# Patient Record
Sex: FEMALE | Race: WHITE | Employment: FULL TIME | URBAN - METROPOLITAN AREA
[De-identification: names, ages, dates, MRNs, and addresses within clinical notes are randomized per-mention and may not be internally consistent; named-entity substitution may affect disease eponyms.]

---

## 2017-04-24 ENCOUNTER — TELEPHONE (OUTPATIENT)
Dept: FAMILY MEDICINE | Facility: OTHER | Age: 37
End: 2017-04-24

## 2017-04-24 ENCOUNTER — HOSPITAL ENCOUNTER (OUTPATIENT)
Dept: CT IMAGING | Facility: CLINIC | Age: 37
Discharge: HOME | End: 2017-04-24
Attending: FAMILY MEDICINE | Admitting: FAMILY MEDICINE
Payer: COMMERCIAL

## 2017-04-24 ENCOUNTER — OFFICE VISIT (OUTPATIENT)
Dept: FAMILY MEDICINE | Facility: OTHER | Age: 37
End: 2017-04-24
Payer: COMMERCIAL

## 2017-04-24 VITALS
DIASTOLIC BLOOD PRESSURE: 80 MMHG | BODY MASS INDEX: 34.66 KG/M2 | TEMPERATURE: 98.4 F | SYSTOLIC BLOOD PRESSURE: 122 MMHG | RESPIRATION RATE: 18 BRPM | HEART RATE: 80 BPM | WEIGHT: 203 LBS | HEIGHT: 64 IN

## 2017-04-24 DIAGNOSIS — R10.84 ABDOMINAL PAIN, GENERALIZED: Primary | ICD-10-CM

## 2017-04-24 DIAGNOSIS — R23.2 FLUSHING: ICD-10-CM

## 2017-04-24 DIAGNOSIS — R10.84 ABDOMINAL PAIN, GENERALIZED: ICD-10-CM

## 2017-04-24 LAB
ALBUMIN SERPL-MCNC: 3.9 G/DL (ref 3.4–5)
ALP SERPL-CCNC: 65 U/L (ref 40–150)
ALT SERPL W P-5'-P-CCNC: 36 U/L (ref 0–50)
AMYLASE SERPL-CCNC: 40 U/L (ref 30–110)
ANION GAP SERPL CALCULATED.3IONS-SCNC: 8 MMOL/L (ref 3–14)
AST SERPL W P-5'-P-CCNC: 20 U/L (ref 0–45)
BASOPHILS # BLD AUTO: 0 10E9/L (ref 0–0.2)
BASOPHILS NFR BLD AUTO: 0.5 %
BILIRUB SERPL-MCNC: 0.5 MG/DL (ref 0.2–1.3)
BUN SERPL-MCNC: 10 MG/DL (ref 7–30)
CALCIUM SERPL-MCNC: 8.4 MG/DL (ref 8.5–10.1)
CHLORIDE SERPL-SCNC: 105 MMOL/L (ref 94–109)
CO2 SERPL-SCNC: 28 MMOL/L (ref 20–32)
CREAT SERPL-MCNC: 0.8 MG/DL (ref 0.52–1.04)
DIFFERENTIAL METHOD BLD: NORMAL
EOSINOPHIL # BLD AUTO: 0 10E9/L (ref 0–0.7)
EOSINOPHIL NFR BLD AUTO: 0 %
ERYTHROCYTE [DISTWIDTH] IN BLOOD BY AUTOMATED COUNT: 13.3 % (ref 10–15)
GFR SERPL CREATININE-BSD FRML MDRD: 80 ML/MIN/1.7M2
GLUCOSE SERPL-MCNC: 73 MG/DL (ref 70–99)
HCT VFR BLD AUTO: 37.8 % (ref 35–47)
HGB BLD-MCNC: 13.1 G/DL (ref 11.7–15.7)
LIPASE SERPL-CCNC: 98 U/L (ref 73–393)
LYMPHOCYTES # BLD AUTO: 1.6 10E9/L (ref 0.8–5.3)
LYMPHOCYTES NFR BLD AUTO: 36.7 %
MCH RBC QN AUTO: 31.3 PG (ref 26.5–33)
MCHC RBC AUTO-ENTMCNC: 34.7 G/DL (ref 31.5–36.5)
MCV RBC AUTO: 90 FL (ref 78–100)
MONOCYTES # BLD AUTO: 0.3 10E9/L (ref 0–1.3)
MONOCYTES NFR BLD AUTO: 7.1 %
NEUTROPHILS # BLD AUTO: 2.4 10E9/L (ref 1.6–8.3)
NEUTROPHILS NFR BLD AUTO: 55.7 %
PLATELET # BLD AUTO: 241 10E9/L (ref 150–450)
POTASSIUM SERPL-SCNC: 3.4 MMOL/L (ref 3.4–5.3)
PROT SERPL-MCNC: 6.9 G/DL (ref 6.8–8.8)
RBC # BLD AUTO: 4.18 10E12/L (ref 3.8–5.2)
SODIUM SERPL-SCNC: 141 MMOL/L (ref 133–144)
WBC # BLD AUTO: 4.2 10E9/L (ref 4–11)

## 2017-04-24 PROCEDURE — 80053 COMPREHEN METABOLIC PANEL: CPT | Performed by: FAMILY MEDICINE

## 2017-04-24 PROCEDURE — 99214 OFFICE O/P EST MOD 30 MIN: CPT | Performed by: FAMILY MEDICINE

## 2017-04-24 PROCEDURE — 74177 CT ABD & PELVIS W/CONTRAST: CPT

## 2017-04-24 PROCEDURE — 36415 COLL VENOUS BLD VENIPUNCTURE: CPT | Performed by: FAMILY MEDICINE

## 2017-04-24 PROCEDURE — 25500064 ZZH RX 255 OP 636: Performed by: RADIOLOGY

## 2017-04-24 PROCEDURE — 83690 ASSAY OF LIPASE: CPT | Performed by: FAMILY MEDICINE

## 2017-04-24 PROCEDURE — 85025 COMPLETE CBC W/AUTO DIFF WBC: CPT | Performed by: FAMILY MEDICINE

## 2017-04-24 PROCEDURE — 82150 ASSAY OF AMYLASE: CPT | Performed by: FAMILY MEDICINE

## 2017-04-24 PROCEDURE — 25000125 ZZHC RX 250: Performed by: RADIOLOGY

## 2017-04-24 RX ORDER — IOPAMIDOL 755 MG/ML
500 INJECTION, SOLUTION INTRAVASCULAR ONCE
Status: COMPLETED | OUTPATIENT
Start: 2017-04-24 | End: 2017-04-24

## 2017-04-24 RX ADMIN — SODIUM CHLORIDE 60 ML: 9 INJECTION, SOLUTION INTRAVENOUS at 13:42

## 2017-04-24 RX ADMIN — IOPAMIDOL 100 ML: 755 INJECTION, SOLUTION INTRAVENOUS at 13:42

## 2017-04-24 ASSESSMENT — PAIN SCALES - GENERAL: PAINLEVEL: MODERATE PAIN (4)

## 2017-04-24 NOTE — TELEPHONE ENCOUNTER
DID discussed EGD as next step, but informed her I would like urine back first to see if we need colonoscopy evaluation as well.  Lynn Lynn MD

## 2017-04-24 NOTE — PROGRESS NOTES
SUBJECTIVE:                                                    Janeth Suárez is a 37 year old female who presents to clinic today for the following health issues:      HPI    ED/UC Followup:    Facility:  [facility]  Date of visit: 4/23/17  Reason for visit: abominal pain  Current Status: same     GI/MS: The patient reports that she has been experiencing abdominal and back pain recently. She took pain medication last night and reports that they have helped greatly with the pain. Pain starts in upper abdomen and radiates into mid back. Episodes will start gradually and worsen. She reports that the back pain is more tolerable compared to the abdominal pain. She reports that eating can make her abdominal pain worse. Pain does not improve after bowel movements.    Problem list and histories reviewed & adjusted, as indicated.  Additional history: as documented    Patient Active Problem List   Diagnosis     CARDIOVASCULAR SCREENING; LDL GOAL LESS THAN 160     Tobacco use disorder     Past Surgical History:   Procedure Laterality Date     C/SECTION, CLASSICAL      X 2       Social History   Substance Use Topics     Smoking status: Current Every Day Smoker     Packs/day: 0.50     Types: Cigarettes     Smokeless tobacco: Never Used      Comment: E-Cig     Alcohol use Yes      Comment: sparingly     Family History   Problem Relation Age of Onset     Mental Illness Mother      DIABETES No family hx of      Coronary Artery Disease No family hx of      Hypertension No family hx of      Hyperlipidemia No family hx of      Breast Cancer No family hx of      Cancer - colorectal No family hx of      Ovarian Cancer No family hx of      Prostate Cancer No family hx of      Other Cancer No family hx of      Depression/Anxiety No family hx of      CEREBROVASCULAR DISEASE No family hx of      Anesthesia Reaction No family hx of      Thyroid Disease No family hx of      Asthma No family hx of      OSTEOPOROSIS No family hx of       "Chemical Addiction No family hx of      Known Genetic Syndrome No family hx of      Obesity No family hx of            ROS:  Constitutional, HEENT, cardiovascular, pulmonary, GI, , musculoskeletal, neuro, skin, endocrine and psych systems are negative, except as in HPI or otherwise noted     This document serves as a record of the services and decisions personally performed and made by Lynn Lynn MD. It was created on her behalf by Celso Sanchez , a trained medical scribe. The creation of this document is based the provider's statements to the medical scribe.  Celso Sanchez, April 24, 2017 10:44 AM     OBJECTIVE:                                                    /80 (BP Location: Left arm, Patient Position: Chair, Cuff Size: Adult Large)  Pulse 80  Temp 98.4  F (36.9  C) (Temporal)  Resp 18  Ht 1.613 m (5' 3.5\")  Wt 92.1 kg (203 lb)  LMP 04/21/2017 (Exact Date)  Breastfeeding? No  BMI 35.4 kg/m2  Body mass index is 35.4 kg/(m^2).   GENERAL: healthy, alert, well nourished, well hydrated, no distress  ABDOMEN: RLQ tender to palpation.  MS: extremities- no gross deformities noted, no edema  SKIN: no suspicious lesions, no rashes  PSYCH: Alert and oriented times 3; speech- coherent , normal rate and volume; able to articulate logical thoughts, able to abstract reason, no tangential thoughts, no hallucinations or delusions, affect- normal    No results found for this or any previous visit (from the past 24 hour(s)).     ASSESSMENT/PLAN:                                                        ICD-10-CM    1. Abdominal pain, generalized R10.84 Lipase     Amylase     **Comprehensive metabolic panel FUTURE anytime     CBC with platelets and differential     CT Abdomen Pelvis w Contrast     Patient is having RLQ ab pain today, though a little into the epigastric as well.  Started several months ago while in [place].  Worsening now, so not sure if 2 different processes.  Will get labs for common epigastric " from 2 months ago issues and CT scan for the appendix which is the more immediate concern.  Also has back pain, but has some of this chronic and is suspicious it may be worse due to sitting funny to try to get comfortable.  May consider stool sample o and p if eos are high.  Otherwise endoscopy if all else is normal.      There are no Patient Instructions on file for this visit.    The information in this document, created by the medical scribe for me, accurately reflects the services I personally performed and the decisions made by me. I have reviewed and approved this document for accuracy.   MD Lynn Song MD, MD  [facility]

## 2017-04-24 NOTE — MR AVS SNAPSHOT
After Visit Summary   4/24/2017    Janeth Suárez    MRN: [id number]           Patient Information     Date Of Birth          1980        Visit Information        Provider Department      4/24/2017 10:30 AM Lynn Lynn MD [facility]        Today's Diagnoses     Abdominal pain, generalized    -  1       Follow-ups after your visit        Your next 10 appointments already scheduled     Apr 24, 2017  1:30 PM CDT   CT ABDOMEN PELVIS W CONTRAST with PHCT1   [facility] CT Scan ([facility])    [address] [place] [id number] [phone]           Please bring any scans or X-rays taken at other hospitals, if similar tests were done. Also bring a list of your medicines, including vitamins, minerals and over-the-counter drugs. It is safest to leave personal items at home.  Be sure to tell your doctor:   If you have any allergies.   If there s any chance you are pregnant.   If you are breastfeeding.   If you have any special needs.  You may have contrast for this exam. To prepare:   Do not eat or drink for 2 hours before your exam. If you need to take medicine, you may take it with small sips of water. (We may ask you to take liquid medicine as well.)   The day before your exam, drink extra fluids at least six 8-ounce glasses (unless your doctor tells you to restrict your fluids).  Patients over 70 or patients with diabetes or kidney problems:   If you haven t had a blood test (creatinine test) within the last 30 days, go to your clinic or Diagnostic Imaging Department for this test.  If you have diabetes:   If your kidney function is normal, continue taking your metformin (Avandamet, Glucophage, Glucovance, Metaglip) on the day of your exam.   If your kidney function is abnormal, wait 48 hours before restarting this medicine.  You will have oral contrast for this exam:   You will drink the contrast at home. Get this from your clinic or  "Diagnostic Imaging Department. Please follow the directions given.  Please wear loose clothing, such as a sweat suit or jogging clothes. Avoid snaps, zippers and other metal. We may ask you to undress and put on a hospital gown.  If you have any questions, please call the Imaging Department where you will have your exam.              Future tests that were ordered for you today     Open Future Orders        Priority Expected Expires Ordered    CT Abdomen Pelvis w Contrast Routine  2018            Who to contact     If you have questions or need follow up information about today's clinic visit or your schedule please contact [facility] directly at [phone].  Normal or non-critical lab and imaging results will be communicated to you by MyChart, letter or phone within 4 business days after the clinic has received the results. If you do not hear from us within 7 days, please contact the clinic through [employer] or phone. If you have a critical or abnormal lab result, we will notify you by phone as soon as possible.  Submit refill requests through [employer] or call your pharmacy and they will forward the refill request to us. Please allow 3 business days for your refill to be completed.          Additional Information About Your Visit        [employer] Information     [employer] lets you send messages to your doctor, view your test results, renew your prescriptions, schedule appointments and more. To sign up, go to [email or web address] . Click on \"Log in\" on the left side of the screen, which will take you to the Welcome page. Then click on \"Sign up Now\" on the right side of the page.     You will be asked to enter the access code listed below, as well as some personal information. Please follow the directions to create your username and password.     Your access code is: [email or web address]  Expires: 2017 11:07 AM     Your access code will  in 90 days. If you need help or a new code, " "please call your [facility] clinic or [phone].        Care EveryWhere ID     This is your Care EveryWhere ID. This could be used by other organizations to access your [facility] medical records  [id number]        Your Vitals Were     Pulse Temperature Respirations Height Last Period Breastfeeding?    80 98.4  F (36.9  C) (Temporal) 18 5' 3.5\" (1.613 m) 04/21/2017 (Exact Date) No    BMI (Body Mass Index)                   35.4 kg/m2            Blood Pressure from Last 3 Encounters:   04/24/17 122/80   02/23/15 128/74   12/18/14 120/82    Weight from Last 3 Encounters:   04/24/17 203 lb (92.1 kg)   02/23/15 195 lb 1.6 oz (88.5 kg)   12/18/14 195 lb 14.4 oz (88.9 kg)              We Performed the Following     **Comprehensive metabolic panel FUTURE anytime     Amylase     CBC with platelets and differential     Lipase        Primary Care Provider Office Phone # Fax #    JULIO CÉSAR [facility] [phone]       [facility] [address] [facility] [address]        Thank you!     Thank you for choosing [facility]  for your care. Our goal is always to provide you with excellent care. Hearing back from our patients is one way we can continue to improve our services. Please take a few minutes to complete the written survey that you may receive in the mail after your visit with us. Thank you!             Your Updated Medication List - Protect others around you: Learn how to safely use, store and throw away your medicines at www.disposemymeds.org.          This list is accurate as of: 4/24/17 11:07 AM.  Always use your most recent med list.                   Brand Name Dispense Instructions for use    biotin 2.5 mg/mL Susp      Take by mouth daily       fish oil-omega-3 fatty acids 1000 MG capsule      Take 2 g by mouth daily       MULTIVITAMIN PO          VITAMIN D (CHOLECALCIFEROL) PO      Take by mouth daily       zolpidem 5 MG tablet    AMBIEN    30 tablet    Take 0.5 tablets " (2.5 mg) by mouth nightly as needed for sleep

## 2017-04-24 NOTE — TELEPHONE ENCOUNTER
Call from [facility] -- adrenal adenoma not well seen as no non-contrast CT done.  But was noted and her flushing is concerning for possible pheo.  So far other labs are normal, so attempted to get her to  urine container for 24 hour urine.  Order in and missed her as she was not answering her cell phone and radiology let her go before I spoke with her.  LM on her cell to call back and can instruct labs so far normal.  Need a 24 hour urine sample.  Lynn Lynn MD

## 2017-04-24 NOTE — NURSING NOTE
"No chief complaint on file.      Initial /80 (BP Location: Left arm, Patient Position: Chair, Cuff Size: Adult Large)  Pulse 80  Temp 98.4  F (36.9  C) (Temporal)  Resp 18  Ht 5' 3.5\" (1.613 m)  Wt 203 lb (92.1 kg)  LMP 04/21/2017 (Exact Date)  Breastfeeding? No  BMI 35.4 kg/m2 Estimated body mass index is 35.4 kg/(m^2) as calculated from the following:    Height as of this encounter: 5' 3.5\" (1.613 m).    Weight as of this encounter: 203 lb (92.1 kg).  Medication Reconciliation: complete  "

## 2017-04-24 NOTE — TELEPHONE ENCOUNTER
Patient is wondering if she can do the 24 hour urine on one of her days off as she can not do this at work.  Also was unclear on CT result and wondering if there is anything concerning she needs to know about.  She can be reached at [phone].

## 2017-05-09 ENCOUNTER — TRANSFERRED RECORDS (OUTPATIENT)
Dept: HEALTH INFORMATION MANAGEMENT | Facility: CLINIC | Age: 37
End: 2017-05-09

## 2017-05-10 ENCOUNTER — TRANSFERRED RECORDS (OUTPATIENT)
Dept: HEALTH INFORMATION MANAGEMENT | Facility: CLINIC | Age: 37
End: 2017-05-10

## 2017-05-11 ENCOUNTER — TRANSFERRED RECORDS (OUTPATIENT)
Dept: HEALTH INFORMATION MANAGEMENT | Facility: CLINIC | Age: 37
End: 2017-05-11

## 2017-06-01 ENCOUNTER — TRANSFERRED RECORDS (OUTPATIENT)
Dept: HEALTH INFORMATION MANAGEMENT | Facility: CLINIC | Age: 37
End: 2017-06-01

## 2017-06-19 DIAGNOSIS — R23.2 FLUSHING: ICD-10-CM

## 2017-06-19 DIAGNOSIS — R10.84 ABDOMINAL PAIN, GENERALIZED: ICD-10-CM

## 2017-06-19 PROCEDURE — 99000 SPECIMEN HANDLING OFFICE-LAB: CPT | Performed by: FAMILY MEDICINE

## 2017-06-19 PROCEDURE — 82384 ASSAY THREE CATECHOLAMINES: CPT | Mod: 90 | Performed by: FAMILY MEDICINE

## 2017-06-19 NOTE — PROGRESS NOTES
SUBJECTIVE:                                                    Janeth Suárez is a 37 year old female who presents to clinic today for the following health issues:    Pt is here to follow up on labs.     Results for orders placed or performed in visit on 06/19/17   Catecholamines fractioned free urine   Result Value Ref Range    Catecholamines Duration Urine 24 h    Catecholamines Volume Urine 2000 mL    Urine Dopamine/Creatinine 235     Urine Dopamine 362 (H)     Dopamine per Volume Urine 1     Urine Norepineph/Creatinine 18     Urine Norepinephrine 28     Norepinephrine per Volume Urine 14     Urine Epinephrine/Creatinine 1     Urine Epinephrine 2     Epinephrine per Volume Urine 181     Catecholamines Fract Urine Free Interp       SEE NOTE  (Note)  TEST INFORMATION: Catecholamines Fractionated, Urine Free  The optimal specimen for this testing is a 24-hour urine  collection. Mass per day calculations are not reported for  patients younger than 4 years of age and for the following  specimen types: a random collection, a collection with  duration of less than 20 hours, a collection with duration  of greater than 28 hours, or a collection with total volume  less than 400 mL (if 18 years of age or older) or greater  than 5000 mL (all ages). Ratios to creatinine may be useful  for these evaluations.  Smaller increases in catecholamine concentrations (less  than two times the upper limit) usually are the result of  physiological stimuli, drugs, or improper specimen  collection. Significant elevation of one or more  catecholamines (three or more times the upper reference  limit) is associated with an increased probability of a  neuroendocrine tumor.  Access complete set of age- and/or gender-specific  reference interva ls for this test in the [facility]  Test Directory (TutorialTab).  Test developed and characteristics determined by [facility] [facility]. See Compliance Statement B: [employer]/CS       Catecholamines Fract Creat Random Urine Free 77     Catecholamines Fract Creat Timed Urine Free 1540      HPI    Concern - Stomach aches/ kidney pain      Onset: 3-22-17    Description:   Food is causing stomach ache, upper stomach area.   Kidney pain dull and ache constant     Intensity: moderate, severe    Progression of Symptoms:  constant    Accompanying Signs & Symptoms:  Pt states that she feels off, and a burning feeling in the stomach        Previous history of similar problem:   None     Precipitating factors:   Worsened by: food     Alleviating factors:  Improved by: none        Therapies Tried and outcome: acid reducer, pepto bismol, Gas-x pills, Anti acids. Tried tramadol, needs Ambien refill.    MS/: The patient reports kidney pain.since 3/22. She notes that pain episodes often pulsate with pain. Her last episode was last Wednesday. Her GI doctor told her that there was no reason for her pain. She notes that her abdominal pain is dull and consistent and lasts for 8 hours. She also notes associated symptoms of anxiety during these episodes.    CONST: The patient reports that she is having difficulty falling asleep. She is unsure if it is due to anxiety.    Problem list and histories reviewed & adjusted, as indicated.  Additional history: as documented    Patient Active Problem List   Diagnosis     CARDIOVASCULAR SCREENING; LDL GOAL LESS THAN 160     Tobacco use disorder     Past Surgical History:   Procedure Laterality Date     C/SECTION, CLASSICAL      X 2       Social History   Substance Use Topics     Smoking status: Current Every Day Smoker     Packs/day: 0.50     Types: Cigarettes     Smokeless tobacco: Never Used      Comment: E-Cig     Alcohol use Yes      Comment: sparingly     Family History   Problem Relation Age of Onset     Mental Illness Mother      DIABETES No family hx of      Coronary Artery Disease No family hx of      Hypertension No family hx of      Hyperlipidemia No family hx of       Breast Cancer No family hx of      Cancer - colorectal No family hx of      Ovarian Cancer No family hx of      Prostate Cancer No family hx of      Other Cancer No family hx of      Depression/Anxiety No family hx of      CEREBROVASCULAR DISEASE No family hx of      Anesthesia Reaction No family hx of      Thyroid Disease No family hx of      Asthma No family hx of      OSTEOPOROSIS No family hx of      Chemical Addiction No family hx of      Known Genetic Syndrome No family hx of      Obesity No family hx of            ROS:  Constitutional, HEENT, cardiovascular, pulmonary, GI, , musculoskeletal, neuro, skin, endocrine and psych systems are negative, except as in HPI or otherwise noted     This document serves as a record of the services and decisions personally performed and made by Lynn Lynn MD. It was created on her behalf by Celso Sanchez , a trained medical scribe. The creation of this document is based the provider's statements to the medical scribe.  Celso Sanchez, June 22, 2017 4:38 PM     OBJECTIVE:                                                    /82  Pulse 81  Temp 97.6  F (36.4  C) (Oral)  Resp 16  Wt 199 lb (90.3 kg)  SpO2 99%  BMI 34.7 kg/m2  Body mass index is 34.7 kg/(m^2).   GENERAL: healthy, alert, well nourished, well hydrated, no distress, obese.  MS: Point tenderness slightly above SI joint bilaterally.  SKIN: no suspicious lesions, no rashes  PSYCH: Alert and oriented times 3; speech- coherent , normal rate and volume; able to articulate logical thoughts, able to abstract reason, no tangential thoughts, no hallucinations or delusions, affect- normal    No results found for this or any previous visit (from the past 24 hour(s)).     ASSESSMENT/PLAN:                                                        ICD-10-CM    1. Insomnia, unspecified type G47.00 SLEEP EVALUATION & MANAGEMENT REFERRAL - ADULT   2. Tobacco use disorder F17.200    3. Anxiety F41.9      -Discussed  previous lab results, told patient that her Catecholamine levels were normal. -hida scan normal.  GI completed normal evaluation.  Diagnosis is IBS.  -Discussed functional bowel disease and IBS in some detail; FODMAP diet info given to pt. (see pt instructions), pt notes that her abdominal pain causes her to have bowel movements during episodic events.  -Discussed her previous CT scan, discussed her left adrenal adenoma, answered questions/concerns.  -Discussed her sleep issues, I recommended anxiety and stress reduction + exercise. Sleep specialist referral if no improvement.  Patient was in denial that her symptoms may be anxiety as she feels her life does not have much stress at this time.  But with her current symptoms, the most likely reason is anxiety and there is nothing else that the work up indicates.  As her GI symptoms are so frustrating for her and the med GI gave her to try did not help.  I did discuss that anxiety is a trigger and that treatment of her anxiety is likely to give her the greatest result.  Willing to try meds.  Will start zoloft and can f/u virtually in 1 month.    Patient Instructions   FODMAP diet, reduce stress, note IBS triggers.  -avoid NSAID's, tylenol is ok to use. Biofreeze, other OTC ointments.  -Cardio exercise 30 mins daily + yoga/meditation daily. Write down stuff that are on your head, clear your head before you meditate. Sleep specialist referral if no improvement.    -Email/phone or office visit in a few weeks regarding your anxiety.    Length of visit was 55 minutes with more than 50 percent of that time used for discussing medical concerns and education    The information in this document, created by the medical scribe for me, accurately reflects the services I personally performed and the decisions made by me. I have reviewed and approved this document for accuracy.   MD Lynn Song MD, MD  [facility]

## 2017-06-22 ENCOUNTER — OFFICE VISIT (OUTPATIENT)
Dept: FAMILY MEDICINE | Facility: OTHER | Age: 37
End: 2017-06-22
Payer: COMMERCIAL

## 2017-06-22 VITALS
RESPIRATION RATE: 16 BRPM | OXYGEN SATURATION: 99 % | SYSTOLIC BLOOD PRESSURE: 124 MMHG | WEIGHT: 199 LBS | TEMPERATURE: 97.6 F | HEART RATE: 81 BPM | BODY MASS INDEX: 34.7 KG/M2 | DIASTOLIC BLOOD PRESSURE: 82 MMHG

## 2017-06-22 DIAGNOSIS — G47.00 INSOMNIA, UNSPECIFIED TYPE: Primary | ICD-10-CM

## 2017-06-22 DIAGNOSIS — F17.200 TOBACCO USE DISORDER: ICD-10-CM

## 2017-06-22 DIAGNOSIS — F41.9 ANXIETY: ICD-10-CM

## 2017-06-22 PROCEDURE — 99215 OFFICE O/P EST HI 40 MIN: CPT | Performed by: FAMILY MEDICINE

## 2017-06-22 ASSESSMENT — PAIN SCALES - GENERAL: PAINLEVEL: NO PAIN (0)

## 2017-06-22 NOTE — PATIENT INSTRUCTIONS
FODMAP diet, reduce stress, note IBS triggers.  -avoid NSAID's, tylenol is ok to use. Biofreeze, other OTC ointments.  -Cardio exercise 30 mins daily + yoga/meditation daily. Write down stuff that are on your head, clear your head before you meditate. Sleep specialist referral if no improvement.    -Email/phone or office visit in a few weeks regarding your anxiety.

## 2017-06-22 NOTE — NURSING NOTE
"Chief Complaint   Patient presents with     Lab Result Notice     Health Maintenance     justice, mychart, tobacco use       Initial /82  Pulse 81  Temp 97.6  F (36.4  C) (Oral)  Resp 16  Wt 199 lb (90.3 kg)  SpO2 99%  BMI 34.7 kg/m2 Estimated body mass index is 34.7 kg/(m^2) as calculated from the following:    Height as of 4/24/17: 5' 3.5\" (1.613 m).    Weight as of this encounter: 199 lb (90.3 kg).  Medication Reconciliation: complete    "

## 2017-06-22 NOTE — MR AVS SNAPSHOT
After Visit Summary   6/22/2017    Janeth Suárez    MRN: [id number]           Patient Information     Date Of Birth          1980        Visit Information        Provider Department      6/22/2017 4:30 PM Lynn Lynn MD [facility]        Today's Diagnoses     Insomnia, unspecified type    -  1    Tobacco use disorder        Anxiety          Care Instructions    FODMAP diet, reduce stress, note IBS triggers.  -avoid NSAID's, tylenol is ok to use. Biofreeze, other OTC ointments.  -Cardio exercise 30 mins daily + yoga/meditation daily. Write down stuff that are on your head, clear your head before you meditate. Sleep specialist referral if no improvement.    -Email/phone or office visit in a few weeks regarding your anxiety.          Follow-ups after your visit        Additional Services     SLEEP EVALUATION & MANAGEMENT REFERRAL - ADULT       Please be aware that coverage of these services is subject to the terms and limitations of your health insurance plan.  Call member services at your health plan with any benefit or coverage questions.      Please bring the following to your appointment:    >>   List of current medications   >>   This referral request   >>   Any documents/labs given to you for this referral    [facility] [place] [facility] [phone] (Age 15 and up)                  Future tests that were ordered for you today     Open Future Orders        Priority Expected Expires Ordered    SLEEP EVALUATION & MANAGEMENT REFERRAL - ADULT Routine  6/22/2018 6/22/2017            Who to contact     If you have questions or need follow up information about today's clinic visit or your schedule please contact [facility] directly at [phone].  Normal or non-critical lab and imaging results will be communicated to you by MyChart, letter or phone within 4 business days after the clinic has received the results. If you do not hear from us within 7  "days, please contact the clinic through [email or web address] or phone. If you have a critical or abnormal lab result, we will notify you by phone as soon as possible.  Submit refill requests through [email or web address] or call your pharmacy and they will forward the refill request to us. Please allow 3 business days for your refill to be completed.          Additional Information About Your Visit        [employer] Information     [email or web address] lets you send messages to your doctor, view your test results, renew your prescriptions, schedule appointments and more. To sign up, go to [email or web address] . Click on \"Log in\" on the left side of the screen, which will take you to the Welcome page. Then click on \"Sign up Now\" on the right side of the page.     You will be asked to enter the access code listed below, as well as some personal information. Please follow the directions to create your username and password.     Your access code is: [email or web address]  Expires: 2017 11:07 AM     Your access code will  in 90 days. If you need help or a new code, please call your [place] clinic or [phone].        Care EveryWhere ID     This is your Care EveryWhere ID. This could be used by other organizations to access your [place] medical records  [id number]        Your Vitals Were     Pulse Temperature Respirations Pulse Oximetry BMI (Body Mass Index)       81 97.6  F (36.4  C) (Oral) 16 99% 34.7 kg/m2        Blood Pressure from Last 3 Encounters:   17 124/82   17 122/80   02/23/15 128/74    Weight from Last 3 Encounters:   17 199 lb (90.3 kg)   17 203 lb (92.1 kg)   02/23/15 195 lb 1.6 oz (88.5 kg)                 Today's Medication Changes          These changes are accurate as of: 17  9:36 PM.  If you have any questions, ask your nurse or doctor.               Start taking these medicines.        Dose/Directions    sertraline 50 MG tablet   Commonly known as:  ZOLOFT   Started by:  Lynn Lynn MD        Take " 1/2 tablet (25 mg) for 1-2 weeks, then increase to 1 tablet orally daily   Quantity:  30 tablet   Refills:  0            Where to get your medicines      These medications were sent to [facility] - [place], [place] - [address]  [address] [facility] [place], [place] [address]     Phone:  [phone]     sertraline 50 MG tablet                Primary Care Provider Office Phone # Fax #    Roma JULIO CÉSAR [facility] [phone]       [facility] [address] [facility] [address]  [place] [address]        Equal Access to Services     [facility]: Hadii aad ku hadasho Soomaali, waaxda luqadaha, qaybta kaalmada adeegyada, leon velazquez . So [facility] [phone].    ATENCIÓN: Si habla español, tiene a bell disposición servicios gratuitos de asistencia lingüística. [facility] [phone].    We comply with applicable federal civil rights laws and [place] laws. We do not discriminate on the basis of race, color, national origin, age, disability sex, sexual orientation or gender identity.            Thank you!     Thank you for choosing [facility]  for your care. Our goal is always to provide you with excellent care. Hearing back from our patients is one way we can continue to improve our services. Please take a few minutes to complete the written survey that you may receive in the mail after your visit with us. Thank you!             Your Updated Medication List - Protect others around you: Learn how to safely use, store and throw away your medicines at www.disposemymeds.org.          This list is accurate as of: 6/22/17  9:36 PM.  Always use your most recent med list.                   Brand Name Dispense Instructions for use Diagnosis    biotin 2.5 mg/mL Susp      Take by mouth daily        fish oil-omega-3 fatty acids 1000 MG capsule      Take 2 g by mouth daily        MULTIVITAMIN PO           sertraline 50 MG tablet    ZOLOFT    30 tablet    Take 1/2 tablet (25  mg) for 1-2 weeks, then increase to 1 tablet orally daily        VITAMIN D (CHOLECALCIFEROL) PO      Take by mouth daily        zolpidem 5 MG tablet    AMBIEN    30 tablet    Take 0.5 tablets (2.5 mg) by mouth nightly as needed for sleep    Insomnia

## 2017-06-22 NOTE — PROGRESS NOTES
Free catecholamines are in the normal range.    (when split apart, 1 was just above range, but this does not make a difference to the interpretation)  Lynn Lynn MD

## 2017-06-28 PROBLEM — F41.9 ANXIETY: Status: ACTIVE | Noted: 2017-06-28

## 2017-08-18 ENCOUNTER — TELEPHONE (OUTPATIENT)
Dept: FAMILY MEDICINE | Facility: CLINIC | Age: 37
End: 2017-08-18

## 2017-08-18 NOTE — TELEPHONE ENCOUNTER
Spoke with Miguelito. Discussed there is no C2C on file. Wife is at work and unable to talk. Miguelito stated his wife asked him to find her an appointment for today due to increasing pain with breathing in. MRCP completed with SAMMIE Campbell at [facility], stated they found fluid on the outside of her lungs. CT technician advised to see her PCP this week. Unknown time frame for fluid buildup. Instructed to take 600mg ibuprofen three times a day. Pain is getting worse, hurts to take a breath. Discussed earliest opening is on Monday 08/21/2017, if she is having pain with breathing in, advised being assessed now in an ED.  will call the [facility] and see if she is able to get in, or will encourage her to go to the ED.  will call back if further assistance is needed. Barbie Shipman, RN, BSN

## 2017-08-18 NOTE — TELEPHONE ENCOUNTER
Reviewing the symptoms she should be seen in the ER --would need imaging studies not available in a clinic setting and if there is difficulty breathing.    Erick Prieto MD

## 2017-08-18 NOTE — TELEPHONE ENCOUNTER
Reason for Call:  Same Day Appointment, Requested Provider:  Erick Prieto MD    PCP: Roma Chase    Reason for visit: fu gastro fluid around lung    Duration of symptoms:     Have you been treated for this in the past? yes    Additional comments: is wondering if Dr Prieto would work her in today.     Can we leave a detailed message on this number? YES    Phone number patient can be reached at: Other phone number:  [phone] Mainor spouse    Best Time: any    Call taken on 8/18/2017 at 8:18 AM by Katiuska Rodriguez

## 2017-10-10 ENCOUNTER — TELEPHONE (OUTPATIENT)
Dept: FAMILY MEDICINE | Facility: OTHER | Age: 37
End: 2017-10-10

## 2017-10-11 NOTE — TELEPHONE ENCOUNTER
Pending Prescriptions:                       Disp   Refills    sertraline (ZOLOFT) 50 MG tablet [Pharmac*30 tab*0            Sig: TAKE 1/2 TABLETBY MOUTH ONCE DAILY FOR 1-2 WEEKS,           THEN INCREASE TO 1 TABLET ONCE DAILY         Last Written Prescription Date: 6/22/2017  Last Fill Quantity: 30, # refills: 0  Last Office Visit with [facility] primary care provider:  6/22/2017        Last PHQ-9 score on record=   PHQ-9 SCORE 2/23/2015   Total Score 7

## 2017-10-13 NOTE — TELEPHONE ENCOUNTER
Routing this request to Dr. Lynn --last seen in ER June 2017. Does not appear that I have seen this patient in the past -one No Show appointment..    Erick Prieto MD

## 2017-10-13 NOTE — TELEPHONE ENCOUNTER
She was given 30 days and told to follow up virtually in a month, that was 4 months ago.  Not sure if this is a restart or if she didn't try the medication until now, but I would suggest that she make a follow up appointment with Dr. Lynn (could be E-visit or telephone unless she wants office visit), to discuss this.

## 2017-10-13 NOTE — TELEPHONE ENCOUNTER
Routing refill request to provider for review/approval because:  Appears to be a break in medication - should have been out around 7/22/17    Marixa Cuellar, RN, BSN

## 2017-10-16 NOTE — TELEPHONE ENCOUNTER
Left message for patient to return call to clinic. Needs to be given message below.  Erinn Martel CMA

## 2017-10-17 NOTE — TELEPHONE ENCOUNTER
Left message for patient return call back to clinic.    Lynn Epperson, [facility]  October 17, 2017

## 2017-10-18 NOTE — TELEPHONE ENCOUNTER
Called patient and LM for return call back to clinic. When call is returned please give message below. Isidro Dickson MA

## 2018-03-12 ENCOUNTER — TELEPHONE (OUTPATIENT)
Dept: FAMILY MEDICINE | Facility: OTHER | Age: 38
End: 2018-03-12

## 2018-03-12 LAB
CATECHOLS UR-IMP: ABNORMAL
COLLECT DURATION TIME UR: 24 HR
CREAT 24H UR-MRATE: 1540 MG/D (ref 700–1600)
CREAT UR-MCNC: 77 MG/DL
DOPAMINE 24H UR-MRATE: 362 UG/D (ref 77–324)
DOPAMINE UR-MCNC: 1 UG/L
DOPAMINE/CREAT UR: 235 UG/G CRT (ref 0–250)
EPINEPH 24H UR-MRATE: 2 UG/D (ref 1–7)
EPINEPH UR-MCNC: 181 UG/L
EPINEPH/CREAT UR: 1 UG/G CRT (ref 0–20)
NOREPINEPH 24H UR-MRATE: 28 UG/D (ref 16–71)
NOREPINEPH UR-MCNC: 14 UG/L
NOREPINEPH/CREAT UR: 18 UG/G CRT (ref 0–45)
SPECIMEN VOL ?TM UR: 2000 ML

## 2018-03-12 NOTE — TELEPHONE ENCOUNTER
Please abstract the following data from this visit with this patient into the appropriate field in Epic:    Pap smear done on this date: 02/20/2015 (approximately), by this group: [facility] , results in care everywhere.    Kristine Marti       Summary:    Patient is due/failing the following:   PAP    Action needed:   Patient needs office visit for PAP.    Type of outreach:    none per care everywhere UTD    Questions for provider review:    None                                                                                                                                    Kristine Marti     Chart routed to [employer]       Panel Management Review      Patient has the following on her problem list: None      Composite cancer screening  Chart review shows that this patient is due/due soon for the following Pap Smear

## 2018-06-25 ENCOUNTER — TELEPHONE (OUTPATIENT)
Dept: FAMILY MEDICINE | Facility: OTHER | Age: 38
End: 2018-06-25

## 2018-06-25 NOTE — TELEPHONE ENCOUNTER
Please abstract the following data from this visit with this patient into the appropriate field in Epic:    HPV co-testing completed on 02/20/2015 and was negative.    Kristine Marti      Panel Management Review      Patient has the following on her problem list: None      Composite cancer screening  Chart review shows that this patient is due/due soon for the following Pap Smear  Summary:    Patient is due/failing the following:   PAP    Action needed:   Patient needs office visit for PAP.    Type of outreach:    none UTD    Questions for provider review:    None                                                                                                                                    Kristine Marti       Chart routed to Care Team .

## 2025-07-08 ENCOUNTER — TRANSFERRED RECORDS (OUTPATIENT)
Dept: HEALTH INFORMATION MANAGEMENT | Facility: CLINIC | Age: 45
End: 2025-07-08
Payer: COMMERCIAL

## 2025-07-09 ENCOUNTER — MEDICAL CORRESPONDENCE (OUTPATIENT)
Dept: HEALTH INFORMATION MANAGEMENT | Facility: CLINIC | Age: 45
End: 2025-07-09
Payer: COMMERCIAL

## 2025-07-09 ENCOUNTER — TRANSCRIBE ORDERS (OUTPATIENT)
Dept: OTHER | Age: 45
End: 2025-07-09

## 2025-07-09 DIAGNOSIS — H52.223 REGULAR ASTIGMATISM, BILATERAL: ICD-10-CM

## 2025-07-09 DIAGNOSIS — H52.02 HYPERMETROPIA, LEFT EYE: Primary | ICD-10-CM

## 2025-07-09 DIAGNOSIS — H02.834 DERMATOCHALASIS OF BOTH UPPER EYELIDS: ICD-10-CM

## 2025-07-09 DIAGNOSIS — H52.4 PRESBYOPIA: ICD-10-CM

## 2025-07-09 DIAGNOSIS — H02.831 DERMATOCHALASIS OF BOTH UPPER EYELIDS: ICD-10-CM
